# Patient Record
Sex: FEMALE
[De-identification: names, ages, dates, MRNs, and addresses within clinical notes are randomized per-mention and may not be internally consistent; named-entity substitution may affect disease eponyms.]

---

## 2024-07-23 PROBLEM — Z00.00 ENCOUNTER FOR PREVENTIVE HEALTH EXAMINATION: Status: ACTIVE | Noted: 2024-07-23

## 2024-07-31 ENCOUNTER — APPOINTMENT (OUTPATIENT)
Dept: OBGYN | Facility: CLINIC | Age: 34
End: 2024-07-31

## 2024-10-16 ENCOUNTER — APPOINTMENT (OUTPATIENT)
Dept: OBGYN | Facility: CLINIC | Age: 34
End: 2024-10-16
Payer: COMMERCIAL

## 2024-10-16 VITALS
WEIGHT: 154 LBS | HEIGHT: 62 IN | SYSTOLIC BLOOD PRESSURE: 121 MMHG | HEART RATE: 108 BPM | DIASTOLIC BLOOD PRESSURE: 84 MMHG | BODY MASS INDEX: 28.34 KG/M2

## 2024-10-16 DIAGNOSIS — E78.00 PURE HYPERCHOLESTEROLEMIA, UNSPECIFIED: ICD-10-CM

## 2024-10-16 DIAGNOSIS — Z83.3 FAMILY HISTORY OF DIABETES MELLITUS: ICD-10-CM

## 2024-10-16 DIAGNOSIS — Z34.82 ENCOUNTER FOR SUPERVISION OF OTHER NORMAL PREGNANCY, SECOND TRIMESTER: ICD-10-CM

## 2024-10-16 PROCEDURE — 36415 COLL VENOUS BLD VENIPUNCTURE: CPT

## 2024-10-16 PROCEDURE — 0500F INITIAL PRENATAL CARE VISIT: CPT

## 2024-10-22 LAB
25(OH)D3 SERPL-MCNC: 18.8 NG/ML
AFP INTERP SERPL-IMP: NORMAL
AFP INTERP SERPL-IMP: NORMAL
AFP MOM CUT-OFF: 2.5
AFP MOM SERPL: 0.87
AFP PERCENTILE: 33
AFP SERPL-ACNC: 31.72 NG/ML
B19V IGG SER QL IA: 1.12 INDEX
B19V IGG+IGM SER-IMP: NORMAL
B19V IGG+IGM SER-IMP: POSITIVE
B19V IGM FLD-ACNC: 0.16 INDEX
B19V IGM SER-ACNC: NEGATIVE
C TRACH RRNA SPEC QL NAA+PROBE: NOT DETECTED
CARBAMAZEPINE?: NO
CURRENT SMOKER: NORMAL
DIABETES STATUS PATIENT: NORMAL
ESTIMATED AVERAGE GLUCOSE: 120 MG/DL
GA: NORMAL
GESTATIONAL AGE METHOD: NORMAL
HBA1C MFR BLD HPLC: 5.8 %
HX OF NTD NARR: NORMAL
LEAD BLD-MCNC: <1 UG/DL
MULTIPLE PREGNANCY: NORMAL
N GONORRHOEA RRNA SPEC QL NAA+PROBE: NOT DETECTED
NEURAL TUBE DEFECT RISK FETUS: NORMAL
NEURAL TUBE DEFECT RISK POP: NORMAL
RECOM F/U: NO
SOURCE AMPLIFICATION: NORMAL
TEST PERFORMANCE INFO SPEC: NORMAL
TSH SERPL-ACNC: 2.87 UIU/ML
VALPROIC ACID?: NORMAL

## 2024-11-01 ENCOUNTER — NON-APPOINTMENT (OUTPATIENT)
Age: 34
End: 2024-11-01

## 2024-11-01 ENCOUNTER — TRANSCRIPTION ENCOUNTER (OUTPATIENT)
Age: 34
End: 2024-11-01

## 2024-11-11 ENCOUNTER — NON-APPOINTMENT (OUTPATIENT)
Age: 34
End: 2024-11-11

## 2024-11-11 ENCOUNTER — ASOB RESULT (OUTPATIENT)
Age: 34
End: 2024-11-11

## 2024-11-11 ENCOUNTER — APPOINTMENT (OUTPATIENT)
Dept: ANTEPARTUM | Facility: CLINIC | Age: 34
End: 2024-11-11
Payer: COMMERCIAL

## 2024-11-11 PROCEDURE — 76817 TRANSVAGINAL US OBSTETRIC: CPT

## 2024-11-11 PROCEDURE — 76811 OB US DETAILED SNGL FETUS: CPT

## 2024-11-12 ENCOUNTER — APPOINTMENT (OUTPATIENT)
Dept: OBGYN | Facility: CLINIC | Age: 34
End: 2024-11-12
Payer: COMMERCIAL

## 2024-11-12 ENCOUNTER — NON-APPOINTMENT (OUTPATIENT)
Age: 34
End: 2024-11-12

## 2024-11-12 DIAGNOSIS — Z34.82 ENCOUNTER FOR SUPERVISION OF OTHER NORMAL PREGNANCY, SECOND TRIMESTER: ICD-10-CM

## 2024-11-12 DIAGNOSIS — R73.09 OTHER ABNORMAL GLUCOSE: ICD-10-CM

## 2024-11-12 PROCEDURE — 0502F SUBSEQUENT PRENATAL CARE: CPT

## 2024-12-02 ENCOUNTER — TRANSCRIPTION ENCOUNTER (OUTPATIENT)
Age: 34
End: 2024-12-02

## 2024-12-19 ENCOUNTER — NON-APPOINTMENT (OUTPATIENT)
Age: 34
End: 2024-12-19

## 2024-12-19 ENCOUNTER — LABORATORY RESULT (OUTPATIENT)
Age: 34
End: 2024-12-19

## 2024-12-19 ENCOUNTER — APPOINTMENT (OUTPATIENT)
Dept: OBGYN | Facility: CLINIC | Age: 34
End: 2024-12-19
Payer: COMMERCIAL

## 2024-12-19 DIAGNOSIS — Z34.83 ENCOUNTER FOR SUPERVISION OF OTHER NORMAL PREGNANCY, THIRD TRIMESTER: ICD-10-CM

## 2024-12-19 PROCEDURE — 0502F SUBSEQUENT PRENATAL CARE: CPT

## 2024-12-19 PROCEDURE — 36415 COLL VENOUS BLD VENIPUNCTURE: CPT

## 2024-12-20 LAB
25(OH)D3 SERPL-MCNC: 20.8 NG/ML
BASOPHILS # BLD AUTO: 0.03 K/UL
BASOPHILS NFR BLD AUTO: 0.3 %
BLD GP AB SCN SERPL QL: NORMAL
EOSINOPHIL # BLD AUTO: 0.1 K/UL
EOSINOPHIL NFR BLD AUTO: 0.9 %
FERRITIN SERPL-MCNC: 9 NG/ML
HCT VFR BLD CALC: 27.6 %
HGB BLD-MCNC: 8.6 G/DL
HIV1+2 AB SPEC QL IA.RAPID: NONREACTIVE
IMM GRANULOCYTES NFR BLD AUTO: 1 %
LYMPHOCYTES # BLD AUTO: 2.74 K/UL
LYMPHOCYTES NFR BLD AUTO: 24.2 %
MAN DIFF?: NORMAL
MCHC RBC-ENTMCNC: 20.6 PG
MCHC RBC-ENTMCNC: 31.2 G/DL
MCV RBC AUTO: 66.2 FL
MONOCYTES # BLD AUTO: 0.94 K/UL
MONOCYTES NFR BLD AUTO: 8.3 %
NEUTROPHILS # BLD AUTO: 7.41 K/UL
NEUTROPHILS NFR BLD AUTO: 65.3 %
PLATELET # BLD AUTO: 381 K/UL
RBC # BLD: 4.17 M/UL
RBC # FLD: 18.6 %
T PALLIDUM AB SER QL IA: NEGATIVE
WBC # FLD AUTO: 11.33 K/UL

## 2024-12-30 ENCOUNTER — TRANSCRIPTION ENCOUNTER (OUTPATIENT)
Age: 34
End: 2024-12-30

## 2024-12-30 ENCOUNTER — NON-APPOINTMENT (OUTPATIENT)
Age: 34
End: 2024-12-30

## 2025-01-10 ENCOUNTER — NON-APPOINTMENT (OUTPATIENT)
Age: 35
End: 2025-01-10

## 2025-01-10 ENCOUNTER — TRANSCRIPTION ENCOUNTER (OUTPATIENT)
Age: 35
End: 2025-01-10

## 2025-01-10 DIAGNOSIS — N76.0 ACUTE VAGINITIS: ICD-10-CM

## 2025-01-10 DIAGNOSIS — B96.89 ACUTE VAGINITIS: ICD-10-CM

## 2025-01-10 RX ORDER — METRONIDAZOLE 7.5 MG/G
0.75 GEL VAGINAL
Qty: 1 | Refills: 0 | Status: ACTIVE | COMMUNITY
Start: 2025-01-10 | End: 1900-01-01

## 2025-01-13 ENCOUNTER — NON-APPOINTMENT (OUTPATIENT)
Age: 35
End: 2025-01-13

## 2025-01-13 ENCOUNTER — APPOINTMENT (OUTPATIENT)
Dept: OBGYN | Facility: CLINIC | Age: 35
End: 2025-01-13
Payer: COMMERCIAL

## 2025-01-13 DIAGNOSIS — Z23 ENCOUNTER FOR IMMUNIZATION: ICD-10-CM

## 2025-01-13 PROCEDURE — 90715 TDAP VACCINE 7 YRS/> IM: CPT

## 2025-01-13 PROCEDURE — 90471 IMMUNIZATION ADMIN: CPT

## 2025-01-13 PROCEDURE — 90656 IIV3 VACC NO PRSV 0.5 ML IM: CPT

## 2025-01-13 PROCEDURE — 90472 IMMUNIZATION ADMIN EACH ADD: CPT

## 2025-01-13 PROCEDURE — 0502F SUBSEQUENT PRENATAL CARE: CPT

## 2025-01-14 ENCOUNTER — NON-APPOINTMENT (OUTPATIENT)
Age: 35
End: 2025-01-14

## 2025-01-14 ENCOUNTER — OUTPATIENT (OUTPATIENT)
Dept: OUTPATIENT SERVICES | Facility: HOSPITAL | Age: 35
LOS: 1 days | End: 2025-01-14
Payer: COMMERCIAL

## 2025-01-14 VITALS — OXYGEN SATURATION: 100 % | HEART RATE: 88 BPM

## 2025-01-14 DIAGNOSIS — O26.899 OTHER SPECIFIED PREGNANCY RELATED CONDITIONS, UNSPECIFIED TRIMESTER: ICD-10-CM

## 2025-01-14 NOTE — OB RN TRIAGE NOTE - FALL HARM RISK - UNIVERSAL INTERVENTIONS
Bed in lowest position, wheels locked, appropriate side rails in place/Call bell, personal items and telephone in reach/Instruct patient to call for assistance before getting out of bed or chair/Non-slip footwear when patient is out of bed/[place] to call system/Physically safe environment - no spills, clutter or unnecessary equipment/Purposeful Proactive Rounding/Room/bathroom lighting operational, light cord in reach

## 2025-01-15 ENCOUNTER — NON-APPOINTMENT (OUTPATIENT)
Age: 35
End: 2025-01-15

## 2025-01-15 VITALS — HEART RATE: 96 BPM | SYSTOLIC BLOOD PRESSURE: 114 MMHG | DIASTOLIC BLOOD PRESSURE: 70 MMHG

## 2025-01-15 VITALS — TEMPERATURE: 98 F | RESPIRATION RATE: 18 BRPM

## 2025-01-15 LAB
APPEARANCE UR: CLEAR — SIGNIFICANT CHANGE UP
BACTERIA # UR AUTO: NEGATIVE /HPF — SIGNIFICANT CHANGE UP
BILIRUB UR-MCNC: NEGATIVE — SIGNIFICANT CHANGE UP
CAST: 0 /LPF — SIGNIFICANT CHANGE UP (ref 0–4)
COLOR SPEC: YELLOW — SIGNIFICANT CHANGE UP
DIFF PNL FLD: NEGATIVE — SIGNIFICANT CHANGE UP
GLUCOSE UR QL: NEGATIVE MG/DL — SIGNIFICANT CHANGE UP
KETONES UR-MCNC: NEGATIVE MG/DL — SIGNIFICANT CHANGE UP
LEUKOCYTE ESTERASE UR-ACNC: NEGATIVE — SIGNIFICANT CHANGE UP
NITRITE UR-MCNC: NEGATIVE — SIGNIFICANT CHANGE UP
PH UR: 7.5 — SIGNIFICANT CHANGE UP (ref 5–8)
PROT UR-MCNC: NEGATIVE MG/DL — SIGNIFICANT CHANGE UP
RBC CASTS # UR COMP ASSIST: 1 /HPF — SIGNIFICANT CHANGE UP (ref 0–4)
SP GR SPEC: 1.01 — SIGNIFICANT CHANGE UP (ref 1–1.03)
SQUAMOUS # UR AUTO: 1 /HPF — SIGNIFICANT CHANGE UP (ref 0–5)
UROBILINOGEN FLD QL: 0.2 MG/DL — SIGNIFICANT CHANGE UP (ref 0.2–1)
WBC UR QL: 1 /HPF — SIGNIFICANT CHANGE UP (ref 0–5)

## 2025-01-15 PROCEDURE — G0463: CPT

## 2025-01-15 PROCEDURE — 87086 URINE CULTURE/COLONY COUNT: CPT

## 2025-01-15 PROCEDURE — 87591 N.GONORRHOEAE DNA AMP PROB: CPT

## 2025-01-15 PROCEDURE — 59025 FETAL NON-STRESS TEST: CPT

## 2025-01-15 PROCEDURE — 81001 URINALYSIS AUTO W/SCOPE: CPT

## 2025-01-15 PROCEDURE — 87491 CHLMYD TRACH DNA AMP PROBE: CPT

## 2025-01-15 NOTE — OB PROVIDER TRIAGE NOTE - HISTORY OF PRESENT ILLNESS
HPI: Pt is a 35yo  @ 30w1d who had presented for intermittent cramping/pressure throughout the day. She states that starting around 3p, she was having q5-10min tightening. The contractions went away  and resumed around 5p, [id number]. When asked, states she last felt the cramping/contractions around 940p. She denies any dysuria, fevers, chills, decreased PO intake, or any other complaints   Fetal movement (+)  Leakage of fluid (-)  Contractions, above   Vaginal bleeding (-)  GBS unk    Prenatal care complicated uncomplicated     OBHx:  - . FT. . 5#6oz. Uncomplicated   - . PTD @ 36wks  to PTL. Around 5# per pt report. Uncomplicated otherwise   GynHx: Denies any gynecologic issues  PMHx: Denies  PSHx: Denies  Med: PNV, Omeprazole   All: NKDA  Psych: Denies hx of mental health issues  SH: Denies hx of smoking, drinking, or drug usage during the pregnancy    Vital Signs Last 24 Hrs  T(C): 36.7 (2025 22:54), Max: 36.7 (2025 22:52)  T(F): 98.1 (2025 22:54), Max: 98.1 (2025 22:54)  HR: 100 (15 Manfred 2025 00:19) (86 - 102)  BP: 108/68 (2025 22:54) (108/68 - 108/68)  BP(mean): --  RR: 18 (2025 22:54) (18 - 18)  SpO2: 98% (15 Manfred 2025 00:19) (98% - 100%)    Parameters below as of 2025 22:54  Patient On (Oxygen Delivery Method): room air      SSE: Physiologic discharge in the vault. No blood in the vault. Cervix appears visually closed   FHT: 140/mod/(+)10x10s/(-)decels  Francis: Acontractile   Sono: Vertex. Anterior placenta. CL 2.92-3.32cm

## 2025-01-15 NOTE — OB PROVIDER TRIAGE NOTE - NSOBPROVIDERNOTE_OBGYN_ALL_OB_FT
A/P: Pt is a 34y G_P_ who presents [active labor/SROM/PROM/ for induction of labor].      1. Admit to Labor and Delivery. Routine Labs. IV Fluids  2. Expectant Management vs. IOL  3. Fetus: Vertex, Reactive/Continue fetal monitoring  4.   5. GBS pos, for Amp / GBS neg  6. Pain: IV pain meds/epidural PRN      Adán Mason, PGY-  Obstetrics and Gynecology    Discussed with Pt is a 35yo  @ 30w1d who had presented for intermittent cramping/pressure throughout the day, here for rule out pre-term labor. In the setting of a long cervix, visually closed cervix, and the absence of contractions on tocometry; suspicion for pre-term labor is low. Fetal status is otherwise reassuring and appropriate for gestational age. UA unremarkable  - Discharge home with return precautions     Adán Mason, PGY-3  Obstetrics and Gynecology    Discussed with Dr. LACEY Ulrich

## 2025-01-16 LAB
CULTURE RESULTS: SIGNIFICANT CHANGE UP
SPECIMEN SOURCE: SIGNIFICANT CHANGE UP

## 2025-01-20 DIAGNOSIS — O47.03 FALSE LABOR BEFORE 37 COMPLETED WEEKS OF GESTATION, THIRD TRIMESTER: ICD-10-CM

## 2025-01-20 DIAGNOSIS — Z3A.30 30 WEEKS GESTATION OF PREGNANCY: ICD-10-CM

## 2025-01-20 DIAGNOSIS — O09.213 SUPERVISION OF PREGNANCY WITH HISTORY OF PRE-TERM LABOR, THIRD TRIMESTER: ICD-10-CM

## 2025-01-28 ENCOUNTER — ASOB RESULT (OUTPATIENT)
Age: 35
End: 2025-01-28

## 2025-01-28 ENCOUNTER — NON-APPOINTMENT (OUTPATIENT)
Age: 35
End: 2025-01-28

## 2025-01-28 ENCOUNTER — APPOINTMENT (OUTPATIENT)
Dept: OBGYN | Facility: CLINIC | Age: 35
End: 2025-01-28
Payer: COMMERCIAL

## 2025-01-28 PROCEDURE — 0502F SUBSEQUENT PRENATAL CARE: CPT

## 2025-01-28 PROCEDURE — 76819 FETAL BIOPHYS PROFIL W/O NST: CPT | Mod: 59

## 2025-01-28 PROCEDURE — 90471 IMMUNIZATION ADMIN: CPT

## 2025-01-28 PROCEDURE — 76816 OB US FOLLOW-UP PER FETUS: CPT

## 2025-01-28 PROCEDURE — 90678 RSV VACC PREF BIVALENT IM: CPT

## 2025-01-29 ENCOUNTER — NON-APPOINTMENT (OUTPATIENT)
Age: 35
End: 2025-01-29

## 2025-02-07 ENCOUNTER — NON-APPOINTMENT (OUTPATIENT)
Age: 35
End: 2025-02-07

## 2025-02-11 ENCOUNTER — TRANSCRIPTION ENCOUNTER (OUTPATIENT)
Age: 35
End: 2025-02-11

## 2025-02-13 ENCOUNTER — APPOINTMENT (OUTPATIENT)
Dept: OBGYN | Facility: CLINIC | Age: 35
End: 2025-02-13

## 2025-02-14 ENCOUNTER — APPOINTMENT (OUTPATIENT)
Dept: OBGYN | Facility: CLINIC | Age: 35
End: 2025-02-14

## 2025-02-24 ENCOUNTER — NON-APPOINTMENT (OUTPATIENT)
Age: 35
End: 2025-02-24

## 2025-02-24 ENCOUNTER — TRANSCRIPTION ENCOUNTER (OUTPATIENT)
Age: 35
End: 2025-02-24

## 2025-02-24 ENCOUNTER — APPOINTMENT (OUTPATIENT)
Dept: OBGYN | Facility: CLINIC | Age: 35
End: 2025-02-24
Payer: COMMERCIAL

## 2025-02-24 PROCEDURE — 0502F SUBSEQUENT PRENATAL CARE: CPT

## 2025-02-26 ENCOUNTER — OUTPATIENT (OUTPATIENT)
Dept: OUTPATIENT SERVICES | Facility: HOSPITAL | Age: 35
LOS: 1 days | End: 2025-02-26
Payer: COMMERCIAL

## 2025-02-26 ENCOUNTER — NON-APPOINTMENT (OUTPATIENT)
Age: 35
End: 2025-02-26

## 2025-02-26 VITALS
DIASTOLIC BLOOD PRESSURE: 71 MMHG | SYSTOLIC BLOOD PRESSURE: 113 MMHG | OXYGEN SATURATION: 96 % | HEART RATE: 119 BPM | TEMPERATURE: 99 F

## 2025-02-26 VITALS
DIASTOLIC BLOOD PRESSURE: 77 MMHG | RESPIRATION RATE: 18 BRPM | TEMPERATURE: 98 F | HEART RATE: 129 BPM | SYSTOLIC BLOOD PRESSURE: 122 MMHG | OXYGEN SATURATION: 96 %

## 2025-02-26 VITALS — OXYGEN SATURATION: 97 % | HEART RATE: 106 BPM

## 2025-02-26 DIAGNOSIS — O26.899 OTHER SPECIFIED PREGNANCY RELATED CONDITIONS, UNSPECIFIED TRIMESTER: ICD-10-CM

## 2025-02-26 PROCEDURE — 99221 1ST HOSP IP/OBS SF/LOW 40: CPT

## 2025-02-26 PROCEDURE — G0463: CPT

## 2025-02-26 PROCEDURE — 59025 FETAL NON-STRESS TEST: CPT

## 2025-02-26 NOTE — OB RN TRIAGE NOTE - CHIEF COMPLAINT QUOTE
I was walking and my exams earlier were unchanged from yane and spotting I was 3cm; I came back from walking on hospital grounds because I didn't want to go home right away

## 2025-02-26 NOTE — OB PROVIDER TRIAGE NOTE - NSHPPHYSICALEXAM_GEN_ALL_CORE
Objective  – VS  T(C): 36.9 (02-26-25 @ 18:43)  HR: 118 (02-26-25 @ 20:03)  BP: 122/77 (02-26-25 @ 18:48)  RR: 18 (02-26-25 @ 18:43)  SpO2: 95% (02-26-25 @ 20:03)  – PE:   CV: RRR  Pulm: breathing comfortably on RA  Abd: gravid, nontender  Extr: moving all extremities with ease  – VE: 4/70/-3  – FHT: 145/moderate variability/+accels/-decels  – [place]: every 10-12 min  – EFW: 2600g extrapolated from sono in office from 32 weeks ago  – Sono: vertex

## 2025-02-26 NOTE — OB RN TRIAGE NOTE - PAIN RATING/NUMBER SCALE (0-10): ACTIVITY
[id number]  [address] [facility] [address] [id number]  Dept: [phone]  Dept Fax: [phone]  Loc: [phone]    Kulwant Landers is a 24 m.o. female who presents today for her medical conditions/complaints as noted below. Kulwant Landers is complaining of Other (Constipation and rash/start last night)        HPI:   Other  Associated symptoms include a rash. Pertinent negatives include no abdominal pain, congestion, coughing, fatigue, fever, headaches, joint swelling, myalgias, neck pain, vomiting or weakness. Chico Sandoval presents to the office accompanied by her mother who reports constipation and a rash on patients diaper area. Mother states that child had a BM this morning that was hard and the child cried because it hurt. Mother also reports on patients labia that was noticed last night. Mother reports child gets bubble bathes. Denies fever, cough, congestion, or GI symptoms. Past Medical History:   Diagnosis Date    Rhinovirus        Past Surgical History:   Procedure Laterality Date    MYRINGOTOMY Bilateral 11/29/2022    BILATERAL MYRINGOTOMY TUBE INSERTION performed by Shashi Dc MD at [facility]       No family history on file. Social History     Tobacco Use    Smoking status: Never    Smokeless tobacco: Never   Substance Use Topics    Alcohol use: Never        Current Outpatient Medications   Medication Sig Dispense Refill    nystatin (MYCOSTATIN) 544593 UNIT/GM ointment Apply topically 2 times daily.  15 g 0    ondansetron (ZOFRAN-ODT) 8 MG TBDP disintegrating tablet Place 0.5 tablets under the tongue every 8 hours as needed for Nausea or Vomiting (Patient not taking: No sig reported) 20 tablet 0    cetirizine HCl (ZYRTEC) 5 MG/5ML SOLN Take 2.5 mLs by mouth daily (Patient not taking: No sig reported) 120 mL 3    ciprofloxacin (CILOXAN) 0.3 % ophthalmic solution 1 gtt tid to effected eye/s for 3-5 days or 2 days past clear (Patient not taking: No sig reported) 2.5 mL 0    lactulose (CHRONULAC) 10 GM/15ML solution Take 8 mLs by mouth every evening (Patient not taking: No sig reported) 237 mL 1    ibuprofen (CHILDRENS ADVIL) 100 MG/5ML suspension Take 2.3 mLs by mouth every 4 hours as needed for Fever (Patient not taking: No sig reported) 240 mL 3    albuterol (ACCUNEB) 0.63 MG/3ML nebulizer solution Take 3 mLs by nebulization every 6 hours as needed for Wheezing (Patient not taking: No sig reported) 270 mL 3     No current facility-administered medications for this visit. No Known Allergies    Health Maintenance   Topic Date Due    COVID-19 Vaccine (1) Never done    Flu vaccine (1 of 2) 10/18/2023 (Originally 8/1/2022)    Lead screen 1 and 2 (2) 04/05/2023    Polio vaccine (5 of 5 - 5-dose series) 04/05/2025    Measles,Mumps,Rubella (MMR) vaccine (2 of 2 - Standard series) 04/05/2025    Varicella vaccine (2 of 2 - 2-dose childhood series) 04/05/2025    DTaP/Tdap/Td vaccine (5 - DTaP) 04/05/2025    HPV vaccine (1 - 2-dose series) 04/05/2032    Meningococcal (ACWY) vaccine (1 - 2-dose series) 04/05/2032    Hepatitis A vaccine  Completed    Hepatitis B vaccine  Completed    Hib vaccine  Completed    Rotavirus vaccine  Completed    Pneumococcal 0-64 years Vaccine  Completed       Subjective:   Review of Systems   Constitutional:  Negative for activity change, appetite change, crying, fatigue and fever. HENT:  Negative for congestion, drooling, rhinorrhea, trouble swallowing and voice change. Eyes:  Negative for discharge and redness. Respiratory:  Negative for cough, choking, wheezing and stridor. Cardiovascular:  Negative for leg swelling and cyanosis. Gastrointestinal:  Positive for constipation. Negative for abdominal distention, abdominal pain, blood in stool, diarrhea and vomiting. Genitourinary:  Negative for decreased urine volume, frequency and urgency.    Musculoskeletal:  Negative for joint swelling, myalgias and neck pain. Skin:  Positive for rash. Negative for pallor. Neurological:  Negative for syncope, weakness and headaches. Psychiatric/Behavioral:  Negative for behavioral problems and sleep disturbance. The patient is not hyperactive. Objective    Physical Exam  Vitals and nursing note reviewed. Constitutional:       General: She is active. She is not in acute distress. Appearance: Normal appearance. She is not toxic-appearing. HENT:      Head: Normocephalic. Right Ear: External ear normal.      Left Ear: External ear normal.      Nose: Nose normal. No congestion or rhinorrhea. Mouth/Throat:      Mouth: Mucous membranes are moist.      Pharynx: Oropharynx is clear. No oropharyngeal exudate or posterior oropharyngeal erythema. Eyes:      Conjunctiva/sclera: Conjunctivae normal.      Pupils: Pupils are equal, round, and reactive to light. Cardiovascular:      Rate and Rhythm: Normal rate and regular rhythm. Pulses: Normal pulses. Heart sounds: Normal heart sounds. No murmur heard. Pulmonary:      Effort: Pulmonary effort is normal. No retractions. Breath sounds: Normal breath sounds. No stridor. No wheezing. Abdominal:      General: Abdomen is flat. Bowel sounds are normal. There is no distension. Palpations: Abdomen is soft. There is no mass. Tenderness: There is no abdominal tenderness. Musculoskeletal:         General: No swelling or deformity. Normal range of motion. Cervical back: Normal range of motion. Skin:     General: Skin is warm and dry. Coloration: Skin is not cyanotic. Findings: Erythema and rash (small red pinpoint rash noted on outside of labia, inside of labia very red and irritated.) present. Neurological:      General: No focal deficit present. Mental Status: She is alert and oriented for age. Motor: No weakness.       Coordination: Coordination normal.       Pulse 54   Temp 97.7 °F (36.5 °C)   Wt 26 lb (11.8 kg)   SpO2 98%     Assessment         Diagnosis Orders   1. Diaper rash  nystatin (MYCOSTATIN) [id number] UNIT/GM ointment      2. Constipation, unspecified constipation type            Plan   Recommend Pedia-lax over the counter, use X1 tonight to help get things moving, give child apple juice as well. Keep peds appointment Tuesday  Nystatin sent in for rash  Avoid bubble bathes  If symptoms worsen go to ER    Mother verbalized understanding and agrees to treatment plan. No orders of the defined types were placed in this encounter. No results found for this visit on 01/13/23. Orders Placed This Encounter   Medications    nystatin (MYCOSTATIN) [id number] UNIT/GM ointment     Sig: Apply topically 2 times daily. Dispense:  15 g     Refill:  0      New Prescriptions    NYSTATIN (MYCOSTATIN) [id number] UNIT/GM OINTMENT    Apply topically 2 times daily. No follow-ups on file. Discussed use, benefits, and side effects of any prescribed medications. All patient questions were answered. Patient voiced understanding of care plan. Patient was given educational materials - see patient instructions below. Patient Instructions   Recommend Pedia-lax over the counter, use X1 tonight to help get things moving, give child apple juice as well. Keep peds appointment Tuesday  Nystatin sent in for rash  Avoid bubble bathes  If symptoms worsen go to ER    Mother verbalized understanding and agrees to treatment plan.        Electronically signed by ADDY Multani CNP on 1/13/2023 at 10:10 AM 6 (moderate pain)

## 2025-02-26 NOTE — OB PROVIDER TRIAGE NOTE - NSOBPROVIDERNOTE_OBGYN_ALL_OB_FT
Assessment  34F  at 36.2wks gestational age evaluated for rule out pre-term labor. Pt found not to be in labor as contractions are tolerable and occurring every 10 min. Fetal status reassuring.     Plan  -Pt to be discharged home at this time  -Pt strongly encouraged to follow up in OB office tomorrow morning as scheduled  -Strict return precautions provided  -Pt verbalized understanding and agreement to plan, all questions answered    Discussed with Dr. Viral Aparicio PA-C

## 2025-02-26 NOTE — OB RN TRIAGE NOTE - NSNURSINGINSTR_OBGYN_ALL_OB_FT
Exam unchanged; pt in early labor. Hydrate well, return for stronger, regular contractions, decreased fetal movement, if bag of water breaks or heavy vaingal bleeding. Any questions call MD/return to floor as you opt to walk on hospital grounds.

## 2025-02-26 NOTE — OB PROVIDER TRIAGE NOTE - HISTORY OF PRESENT ILLNESS
PA Triage Note    Subjective  HPI: 34F  at 36.2wks gestational age presents for rule out pre-term labor. Pt was previously seen in triage earlier today for the same reason. Pt was discharged to ambulate and return for a repeat cervical exam. At previous discharge, pt was 3/70/-3 on exam. Pt reports her contractions are occurring every 5-6 min and rates it a 6/10 pain. +FM. -LOF. -VB. Pt denies any chest pain, SOB, palpitations, or any other concerns.    – PNC: Fe deficiency anemia on PO Fe. GBS unknown. EFW 2600g extrapolated from sono in office from 32 weeks ago.  – OBHx:   1)  PT  at 36wks secondary to PTL, 5lb 9oz, without complications  2)  PT  at 36wks secondary to PTL, 5lb 9oz, without complications  – GynHx: History of fibroids, per chart review no fibroids documented on 20 week ultrasound). Denies miscarriages, ovarian cyst, abnormal pap smear, STI  – PMH: Hypercholesteremia (previously on statin and fenofibrate, d/c-ed during this pregnancy). Fe deficiency anemia on PO Fe.   – PSH: Denies   – Psych: Denies anxiety, depression  – Social: Denies T/E/D  – Meds: PNV, Fe, Vit D  – Allergies: NKDA  – Will accept blood transfusions? Yes

## 2025-02-26 NOTE — OB PROVIDER TRIAGE NOTE - NS_OBGYNHISTORY_OBGYN_ALL_OB_FT
– PNC: Fe deficiency anemia on PO Fe. GBS unknown. EFW 2600g extrapolated from sono in office from 32 weeks ago.  – OBHx:   1)  PT  at 36wks secondary to PTL, 5lb 9oz, without complications  2)  PT  at 36wks secondary to PTL, 5lb 9oz, without complications  – GynHx: History of fibroids, per chart review no fibroids documented on 20 week ultrasound). Denies miscarriages, ovarian cyst, abnormal pap smear, STI

## 2025-02-26 NOTE — OB PROVIDER TRIAGE NOTE - NS_OBGYNHISTORY_OBGYN_ALL_OB_FT
ObHx: 36 wk   5lbs 9oz; 36 wk   5lbs 9oz    GynHx: h/o fibroids (none documented on 20 wk ultrasound); denies abnormal paps, STIs

## 2025-02-26 NOTE — OB PROVIDER TRIAGE NOTE - NSOBPROVIDERNOTE_OBGYN_ALL_OB_FT
34 year old  at 36.2 weeks h/o two PTD now with unchanged exam since Monday    -NST/toco  -Repeat VE @ 1230    d/w MD Viral Funk P-BC 34 year old  at 36.2 weeks h/o two PTD now with unchanged exam since Monday    -NST/toco  -Repeat VE @ 1230    d/w MD Viral Funk [facility]    Update 3:30pm:  - Patient reexamined at 12pm and 330pm and was found to be 3/70/-3 on both exams. Patient is yane q3-6m and feels they are increasing in strength, however is not making change yet. Discussed options with patient including staying in triage for another exam for change around 7pm, going home or going for a walk and returning when contractions are stronger. Return precautions discussed: return if contractions become unbearable and more consistent, if ROM, if vaginal bleeding, if unable to tolerate PO.     Radha Solorzano, PGY2  Discussed with Dr. Stratton

## 2025-02-26 NOTE — OB PROVIDER TRIAGE NOTE - HISTORY OF PRESENT ILLNESS
34 year old  at 36.2 weeks presents with contractions q10min since early this morning, -LOF, -VB, +FM. Pregnancy complicated by subchronic hematoma resolved in second trimester. Patient was seen in OB office Monday and found to be 2.5cm dilated. GBS unknown    ObHx: 36 wk   5lbs 9oz; 36 wk   5lbs 9oz  MedHx: hypercholesteremia (no meds)  SurgHx: denies  GynHx: h/o fibroids (none documented on 20 wk ultrasound); denies abnormal paps, STIs  SocialHx: denies ETOH, tobacco, drug use  PsychHx: denies anxiety, depression, PPD  All: NKDA, NKEA, NKFA  Meds: PNV, Fe    Vital Signs Last 24 Hrs  T(C): 36.8 (2025 10:39), Max: 36.8 (2025 10:39)  T(F): 98.24 (2025 10:39), Max: 98.24 (2025 10:39)  HR: 92 (2025 10:55) (92 - 103)  BP: 117/62 (2025 10:52) (117/62 - 117/62)  BP(mean): --  RR: --  SpO2: 96% (2025 10:55) (89% - 97%)    PE: NAD, AOx3, abdomen soft gravid  VE: 2.5/50/-3  EFM: unable to determine baseline, continued monitoring  [place]: 2-6min  EFW: 2500 grams

## 2025-02-27 ENCOUNTER — NON-APPOINTMENT (OUTPATIENT)
Age: 35
End: 2025-02-27

## 2025-02-27 ENCOUNTER — APPOINTMENT (OUTPATIENT)
Dept: OBGYN | Facility: CLINIC | Age: 35
End: 2025-02-27

## 2025-02-27 ENCOUNTER — INPATIENT (INPATIENT)
Facility: HOSPITAL | Age: 35
LOS: 1 days | Discharge: HOME | DRG: 951 | End: 2025-03-01
Attending: OBSTETRICS & GYNECOLOGY | Admitting: OBSTETRICS & GYNECOLOGY
Payer: COMMERCIAL

## 2025-02-27 ENCOUNTER — APPOINTMENT (OUTPATIENT)
Dept: OBGYN | Facility: CLINIC | Age: 35
End: 2025-02-27
Payer: COMMERCIAL

## 2025-02-27 VITALS — DIASTOLIC BLOOD PRESSURE: 73 MMHG | SYSTOLIC BLOOD PRESSURE: 137 MMHG | HEART RATE: 107 BPM | TEMPERATURE: 98 F

## 2025-02-27 DIAGNOSIS — D50.9 IRON DEFICIENCY ANEMIA, UNSPECIFIED: ICD-10-CM

## 2025-02-27 DIAGNOSIS — Z34.80 ENCOUNTER FOR SUPERVISION OF OTHER NORMAL PREGNANCY, UNSPECIFIED TRIMESTER: ICD-10-CM

## 2025-02-27 DIAGNOSIS — O99.283 ENDOCRINE, NUTRITIONAL AND METABOLIC DISEASES COMPLICATING PREGNANCY, THIRD TRIMESTER: ICD-10-CM

## 2025-02-27 DIAGNOSIS — D21.9 BENIGN NEOPLASM OF CONNECTIVE AND OTHER SOFT TISSUE, UNSPECIFIED: ICD-10-CM

## 2025-02-27 DIAGNOSIS — O47.03 FALSE LABOR BEFORE 37 COMPLETED WEEKS OF GESTATION, THIRD TRIMESTER: ICD-10-CM

## 2025-02-27 DIAGNOSIS — O99.013 ANEMIA COMPLICATING PREGNANCY, THIRD TRIMESTER: ICD-10-CM

## 2025-02-27 DIAGNOSIS — Z3A.36 36 WEEKS GESTATION OF PREGNANCY: ICD-10-CM

## 2025-02-27 DIAGNOSIS — O99.891 OTHER SPECIFIED DISEASES AND CONDITIONS COMPLICATING PREGNANCY: ICD-10-CM

## 2025-02-27 DIAGNOSIS — O26.899 OTHER SPECIFIED PREGNANCY RELATED CONDITIONS, UNSPECIFIED TRIMESTER: ICD-10-CM

## 2025-02-27 DIAGNOSIS — O09.213 SUPERVISION OF PREGNANCY WITH HISTORY OF PRE-TERM LABOR, THIRD TRIMESTER: ICD-10-CM

## 2025-02-27 DIAGNOSIS — E78.5 HYPERLIPIDEMIA, UNSPECIFIED: ICD-10-CM

## 2025-02-27 DIAGNOSIS — E78.00 PURE HYPERCHOLESTEROLEMIA, UNSPECIFIED: ICD-10-CM

## 2025-02-27 LAB
ANISOCYTOSIS BLD QL: SIGNIFICANT CHANGE UP
B-HEM STREP SPEC QL CULT: NORMAL
BASOPHILS # BLD AUTO: 0.1 K/UL — SIGNIFICANT CHANGE UP (ref 0–0.2)
BASOPHILS NFR BLD AUTO: 0.9 % — SIGNIFICANT CHANGE UP (ref 0–2)
BLD GP AB SCN SERPL QL: NEGATIVE — SIGNIFICANT CHANGE UP
BURR CELLS BLD QL SMEAR: PRESENT — SIGNIFICANT CHANGE UP
DACRYOCYTES BLD QL SMEAR: SLIGHT — SIGNIFICANT CHANGE UP
EOSINOPHIL # BLD AUTO: 0.2 K/UL — SIGNIFICANT CHANGE UP (ref 0–0.5)
EOSINOPHIL NFR BLD AUTO: 1.7 % — SIGNIFICANT CHANGE UP (ref 0–6)
HCT VFR BLD CALC: 33.6 % — LOW (ref 34.5–45)
HGB BLD-MCNC: 10.4 G/DL — LOW (ref 11.5–15.5)
HIV 1 & 2 AB SERPL IA.RAPID: SIGNIFICANT CHANGE UP
HYPOCHROMIA BLD QL: SLIGHT — SIGNIFICANT CHANGE UP
LYMPHOCYTES # BLD AUTO: 1.54 K/UL — SIGNIFICANT CHANGE UP (ref 1–3.3)
LYMPHOCYTES # BLD AUTO: 13.2 % — SIGNIFICANT CHANGE UP (ref 13–44)
MACROCYTES BLD QL: SLIGHT — SIGNIFICANT CHANGE UP
MANUAL SMEAR VERIFICATION: SIGNIFICANT CHANGE UP
MCHC RBC-ENTMCNC: 21 PG — LOW (ref 27–34)
MCHC RBC-ENTMCNC: 31 G/DL — LOW (ref 32–36)
MCV RBC AUTO: 67.7 FL — LOW (ref 80–100)
MICROCYTES BLD QL: SIGNIFICANT CHANGE UP
MONOCYTES # BLD AUTO: 0.62 K/UL — SIGNIFICANT CHANGE UP (ref 0–0.9)
MONOCYTES NFR BLD AUTO: 5.3 % — SIGNIFICANT CHANGE UP (ref 2–14)
NEUTROPHILS # BLD AUTO: 9.2 K/UL — HIGH (ref 1.8–7.4)
NEUTROPHILS NFR BLD AUTO: 78.9 % — HIGH (ref 43–77)
OVALOCYTES BLD QL SMEAR: SLIGHT — SIGNIFICANT CHANGE UP
PLAT MORPH BLD: NORMAL — SIGNIFICANT CHANGE UP
PLATELET # BLD AUTO: 251 K/UL — SIGNIFICANT CHANGE UP (ref 150–400)
POLYCHROMASIA BLD QL SMEAR: SLIGHT — SIGNIFICANT CHANGE UP
RBC # BLD: 4.96 M/UL — SIGNIFICANT CHANGE UP (ref 3.8–5.2)
RBC # FLD: 21.5 % — HIGH (ref 10.3–14.5)
RBC BLD AUTO: ABNORMAL
RH IG SCN BLD-IMP: POSITIVE — SIGNIFICANT CHANGE UP
WBC # BLD: 11.66 K/UL — HIGH (ref 3.8–10.5)
WBC # FLD AUTO: 11.66 K/UL — HIGH (ref 3.8–10.5)

## 2025-02-27 PROCEDURE — 0502F SUBSEQUENT PRENATAL CARE: CPT

## 2025-02-27 PROCEDURE — 59025 FETAL NON-STRESS TEST: CPT

## 2025-02-27 PROCEDURE — 59410 OBSTETRICAL CARE: CPT

## 2025-02-27 RX ORDER — PRENATAL 136/IRON/FOLIC ACID 27 MG-1 MG
1 TABLET ORAL DAILY
Refills: 0 | Status: DISCONTINUED | OUTPATIENT
Start: 2025-02-27 | End: 2025-03-01

## 2025-02-27 RX ORDER — CITRIC ACID/SODIUM CITRATE 300-500 MG
15 SOLUTION, ORAL ORAL EVERY 6 HOURS
Refills: 0 | Status: DISCONTINUED | OUTPATIENT
Start: 2025-02-27 | End: 2025-02-28

## 2025-02-27 RX ORDER — SODIUM CHLORIDE 9 G/1000ML
1000 INJECTION, SOLUTION INTRAVENOUS
Refills: 0 | Status: DISCONTINUED | OUTPATIENT
Start: 2025-02-27 | End: 2025-02-28

## 2025-02-27 RX ORDER — MODIFIED LANOLIN 100 %
1 CREAM (GRAM) TOPICAL EVERY 6 HOURS
Refills: 0 | Status: DISCONTINUED | OUTPATIENT
Start: 2025-02-27 | End: 2025-03-01

## 2025-02-27 RX ORDER — OXYTOCIN-SODIUM CHLORIDE 0.9% IV SOLN 30 UNIT/500ML 30-0.9/5 UT/ML-%
167 SOLUTION INTRAVENOUS
Qty: 30 | Refills: 0 | Status: DISCONTINUED | OUTPATIENT
Start: 2025-02-27 | End: 2025-03-01

## 2025-02-27 RX ORDER — SODIUM CHLORIDE 9 G/1000ML
1000 INJECTION, SOLUTION INTRAVENOUS
Refills: 0 | Status: DISCONTINUED | OUTPATIENT
Start: 2025-02-27 | End: 2025-03-01

## 2025-02-27 RX ORDER — MAGNESIUM HYDROXIDE 400 MG/5ML
30 SUSPENSION ORAL
Refills: 0 | Status: DISCONTINUED | OUTPATIENT
Start: 2025-02-27 | End: 2025-03-01

## 2025-02-27 RX ORDER — WITCH HAZEL LEAF
1 FLUID EXTRACT MISCELLANEOUS EVERY 4 HOURS
Refills: 0 | Status: DISCONTINUED | OUTPATIENT
Start: 2025-02-27 | End: 2025-03-01

## 2025-02-27 RX ORDER — PRAMOXINE HCL 1 %
1 GEL (GRAM) TOPICAL EVERY 4 HOURS
Refills: 0 | Status: DISCONTINUED | OUTPATIENT
Start: 2025-02-27 | End: 2025-03-01

## 2025-02-27 RX ORDER — SIMETHICONE 80 MG
80 TABLET,CHEWABLE ORAL EVERY 4 HOURS
Refills: 0 | Status: DISCONTINUED | OUTPATIENT
Start: 2025-02-27 | End: 2025-03-01

## 2025-02-27 RX ORDER — BENZOCAINE 220 MG/G
1 SPRAY, METERED PERIODONTAL EVERY 6 HOURS
Refills: 0 | Status: DISCONTINUED | OUTPATIENT
Start: 2025-02-27 | End: 2025-03-01

## 2025-02-27 RX ORDER — OXYCODONE HYDROCHLORIDE 30 MG/1
5 TABLET ORAL ONCE
Refills: 0 | Status: DISCONTINUED | OUTPATIENT
Start: 2025-02-27 | End: 2025-03-01

## 2025-02-27 RX ORDER — OXYCODONE HYDROCHLORIDE 30 MG/1
5 TABLET ORAL
Refills: 0 | Status: DISCONTINUED | OUTPATIENT
Start: 2025-02-27 | End: 2025-03-01

## 2025-02-27 RX ORDER — ACETAMINOPHEN 500 MG/5ML
975 LIQUID (ML) ORAL
Refills: 0 | Status: DISCONTINUED | OUTPATIENT
Start: 2025-02-27 | End: 2025-03-01

## 2025-02-27 RX ORDER — KETOROLAC TROMETHAMINE 30 MG/ML
30 INJECTION, SOLUTION INTRAMUSCULAR; INTRAVENOUS ONCE
Refills: 0 | Status: DISCONTINUED | OUTPATIENT
Start: 2025-02-27 | End: 2025-02-27

## 2025-02-27 RX ORDER — DIBUCAINE 10 MG/G
1 OINTMENT TOPICAL EVERY 6 HOURS
Refills: 0 | Status: DISCONTINUED | OUTPATIENT
Start: 2025-02-27 | End: 2025-03-01

## 2025-02-27 RX ORDER — OXYTOCIN-SODIUM CHLORIDE 0.9% IV SOLN 30 UNIT/500ML 30-0.9/5 UT/ML-%
4 SOLUTION INTRAVENOUS
Qty: 30 | Refills: 0 | Status: DISCONTINUED | OUTPATIENT
Start: 2025-02-27 | End: 2025-02-27

## 2025-02-27 RX ORDER — OXYTOCIN-SODIUM CHLORIDE 0.9% IV SOLN 30 UNIT/500ML 30-0.9/5 UT/ML-%
333 SOLUTION INTRAVENOUS
Qty: 30 | Refills: 0 | Status: COMPLETED | OUTPATIENT
Start: 2025-02-27 | End: 2025-02-28

## 2025-02-27 RX ORDER — HYDROCORTISONE 10 MG/G
1 CREAM TOPICAL EVERY 6 HOURS
Refills: 0 | Status: DISCONTINUED | OUTPATIENT
Start: 2025-02-27 | End: 2025-03-01

## 2025-02-27 RX ORDER — IBUPROFEN 200 MG
600 TABLET ORAL EVERY 6 HOURS
Refills: 0 | Status: COMPLETED | OUTPATIENT
Start: 2025-02-27 | End: 2026-01-26

## 2025-02-27 RX ORDER — DIPHENHYDRAMINE HCL 12.5MG/5ML
25 ELIXIR ORAL EVERY 6 HOURS
Refills: 0 | Status: DISCONTINUED | OUTPATIENT
Start: 2025-02-27 | End: 2025-03-01

## 2025-02-27 RX ADMIN — OXYTOCIN-SODIUM CHLORIDE 0.9% IV SOLN 30 UNIT/500ML 4 MILLIUNIT(S)/MIN: 30-0.9/5 SOLUTION at 20:36

## 2025-02-27 RX ADMIN — KETOROLAC TROMETHAMINE 30 MILLIGRAM(S): 30 INJECTION, SOLUTION INTRAMUSCULAR; INTRAVENOUS at 23:46

## 2025-02-28 LAB — T PALLIDUM AB TITR SER: NEGATIVE — SIGNIFICANT CHANGE UP

## 2025-02-28 RX ORDER — IBUPROFEN 200 MG
600 TABLET ORAL EVERY 6 HOURS
Refills: 0 | Status: DISCONTINUED | OUTPATIENT
Start: 2025-02-28 | End: 2025-03-01

## 2025-02-28 RX ADMIN — Medication 975 MILLIGRAM(S): at 08:49

## 2025-02-28 RX ADMIN — Medication 1 TABLET(S): at 12:06

## 2025-02-28 RX ADMIN — Medication 975 MILLIGRAM(S): at 16:35

## 2025-02-28 RX ADMIN — Medication 600 MILLIGRAM(S): at 23:52

## 2025-02-28 RX ADMIN — Medication 600 MILLIGRAM(S): at 18:10

## 2025-02-28 RX ADMIN — Medication 975 MILLIGRAM(S): at 20:48

## 2025-02-28 RX ADMIN — Medication 975 MILLIGRAM(S): at 21:25

## 2025-02-28 RX ADMIN — Medication 600 MILLIGRAM(S): at 12:50

## 2025-02-28 RX ADMIN — Medication 600 MILLIGRAM(S): at 06:41

## 2025-02-28 RX ADMIN — Medication 975 MILLIGRAM(S): at 15:39

## 2025-02-28 RX ADMIN — Medication 975 MILLIGRAM(S): at 09:45

## 2025-02-28 RX ADMIN — OXYTOCIN-SODIUM CHLORIDE 0.9% IV SOLN 30 UNIT/500ML 333 MILLIUNIT(S)/MIN: 30-0.9/5 SOLUTION at 23:13

## 2025-02-28 RX ADMIN — Medication 600 MILLIGRAM(S): at 12:06

## 2025-02-28 RX ADMIN — Medication 20 MILLIGRAM(S): at 16:46

## 2025-02-28 RX ADMIN — Medication 600 MILLIGRAM(S): at 18:58

## 2025-03-01 ENCOUNTER — TRANSCRIPTION ENCOUNTER (OUTPATIENT)
Age: 35
End: 2025-03-01

## 2025-03-01 VITALS
OXYGEN SATURATION: 98 % | DIASTOLIC BLOOD PRESSURE: 77 MMHG | HEART RATE: 73 BPM | SYSTOLIC BLOOD PRESSURE: 112 MMHG | RESPIRATION RATE: 18 BRPM | TEMPERATURE: 98 F

## 2025-03-01 LAB
HIV1+2 AB SPEC QL IA.RAPID: NONREACTIVE
T PALLIDUM AB SER QL IA: NEGATIVE

## 2025-03-01 PROCEDURE — 86901 BLOOD TYPING SEROLOGIC RH(D): CPT

## 2025-03-01 PROCEDURE — 86780 TREPONEMA PALLIDUM: CPT

## 2025-03-01 PROCEDURE — 85025 COMPLETE CBC W/AUTO DIFF WBC: CPT

## 2025-03-01 PROCEDURE — 86850 RBC ANTIBODY SCREEN: CPT

## 2025-03-01 PROCEDURE — 86703 HIV-1/HIV-2 1 RESULT ANTBDY: CPT

## 2025-03-01 PROCEDURE — 90707 MMR VACCINE SC: CPT

## 2025-03-01 PROCEDURE — 86900 BLOOD TYPING SEROLOGIC ABO: CPT

## 2025-03-01 RX ORDER — PRENATAL 136/IRON/FOLIC ACID 27 MG-1 MG
0 TABLET ORAL
Qty: 0 | Refills: 0 | DISCHARGE

## 2025-03-01 RX ORDER — MEASLES,MUMPS,RUBELLA LIVE VAC 20000/0.5
0.5 VIAL (EA) SUBCUTANEOUS ONCE
Refills: 0 | Status: COMPLETED | OUTPATIENT
Start: 2025-03-01 | End: 2025-03-01

## 2025-03-01 RX ORDER — IBUPROFEN 200 MG
1 TABLET ORAL
Qty: 0 | Refills: 0 | DISCHARGE
Start: 2025-03-01

## 2025-03-01 RX ORDER — ACETAMINOPHEN 500 MG/5ML
3 LIQUID (ML) ORAL
Qty: 0 | Refills: 0 | DISCHARGE
Start: 2025-03-01

## 2025-03-01 RX ADMIN — Medication 20 MILLIGRAM(S): at 13:49

## 2025-03-01 RX ADMIN — Medication 975 MILLIGRAM(S): at 16:31

## 2025-03-01 RX ADMIN — Medication 600 MILLIGRAM(S): at 12:55

## 2025-03-01 RX ADMIN — Medication 600 MILLIGRAM(S): at 00:30

## 2025-03-01 RX ADMIN — Medication 975 MILLIGRAM(S): at 10:53

## 2025-03-01 RX ADMIN — Medication 975 MILLIGRAM(S): at 03:50

## 2025-03-01 RX ADMIN — Medication 0.5 MILLILITER(S): at 16:39

## 2025-03-01 RX ADMIN — Medication 20 MILLIGRAM(S): at 05:48

## 2025-03-01 RX ADMIN — Medication 975 MILLIGRAM(S): at 17:02

## 2025-03-01 RX ADMIN — Medication 600 MILLIGRAM(S): at 11:56

## 2025-03-01 RX ADMIN — Medication 975 MILLIGRAM(S): at 03:15

## 2025-03-01 RX ADMIN — Medication 975 MILLIGRAM(S): at 09:39

## 2025-03-01 RX ADMIN — Medication 1 TABLET(S): at 11:56

## 2025-03-01 RX ADMIN — Medication 600 MILLIGRAM(S): at 05:48

## 2025-03-03 ENCOUNTER — APPOINTMENT (OUTPATIENT)
Dept: OBGYN | Facility: CLINIC | Age: 35
End: 2025-03-03

## 2025-03-04 ENCOUNTER — TRANSCRIPTION ENCOUNTER (OUTPATIENT)
Age: 35
End: 2025-03-04

## 2025-03-07 ENCOUNTER — APPOINTMENT (OUTPATIENT)
Age: 35
End: 2025-03-07

## 2025-03-07 ENCOUNTER — TRANSCRIPTION ENCOUNTER (OUTPATIENT)
Age: 35
End: 2025-03-07

## 2025-04-07 ENCOUNTER — APPOINTMENT (OUTPATIENT)
Dept: OBGYN | Facility: CLINIC | Age: 35
End: 2025-04-07

## 2025-04-10 ENCOUNTER — APPOINTMENT (OUTPATIENT)
Dept: OBGYN | Facility: CLINIC | Age: 35
End: 2025-04-10

## 2025-04-25 ENCOUNTER — TRANSCRIPTION ENCOUNTER (OUTPATIENT)
Age: 35
End: 2025-04-25

## 2025-04-29 ENCOUNTER — APPOINTMENT (OUTPATIENT)
Dept: OBGYN | Facility: CLINIC | Age: 35
End: 2025-04-29
Payer: COMMERCIAL

## 2025-04-29 VITALS
WEIGHT: 149 LBS | BODY MASS INDEX: 27.42 KG/M2 | DIASTOLIC BLOOD PRESSURE: 88 MMHG | HEIGHT: 62 IN | SYSTOLIC BLOOD PRESSURE: 116 MMHG

## 2025-04-29 DIAGNOSIS — Z30.09 ENCOUNTER FOR OTHER GENERAL COUNSELING AND ADVICE ON CONTRACEPTION: ICD-10-CM

## 2025-04-29 PROCEDURE — 0503F POSTPARTUM CARE VISIT: CPT

## 2025-04-29 RX ORDER — MEDROXYPROGESTERONE ACETATE 150 MG/ML
150 INJECTION, SUSPENSION INTRAMUSCULAR
Qty: 1 | Refills: 1 | Status: ACTIVE | COMMUNITY
Start: 2025-04-29 | End: 1900-01-01

## 2025-07-17 ENCOUNTER — APPOINTMENT (OUTPATIENT)
Dept: OBGYN | Facility: CLINIC | Age: 35
End: 2025-07-17
Payer: COMMERCIAL

## 2025-07-17 PROCEDURE — 99213 OFFICE O/P EST LOW 20 MIN: CPT | Mod: 95

## 2025-07-17 RX ORDER — NORETHINDRONE 0.35 MG/1
0.35 TABLET ORAL DAILY
Qty: 1 | Refills: 1 | Status: ACTIVE | COMMUNITY
Start: 2025-07-17 | End: 1900-01-01

## 2025-09-12 ENCOUNTER — TRANSCRIPTION ENCOUNTER (OUTPATIENT)
Age: 35
End: 2025-09-12